# Patient Record
Sex: MALE | Race: OTHER | NOT HISPANIC OR LATINO | URBAN - METROPOLITAN AREA
[De-identification: names, ages, dates, MRNs, and addresses within clinical notes are randomized per-mention and may not be internally consistent; named-entity substitution may affect disease eponyms.]

---

## 2022-05-31 ENCOUNTER — INPATIENT (INPATIENT)
Facility: HOSPITAL | Age: 32
LOS: 1 days | Discharge: HOME | DRG: 917 | End: 2022-06-02
Attending: INTERNAL MEDICINE | Admitting: INTERNAL MEDICINE
Payer: MEDICAID

## 2022-05-31 VITALS
TEMPERATURE: 98 F | OXYGEN SATURATION: 98 % | HEIGHT: 68 IN | HEART RATE: 93 BPM | SYSTOLIC BLOOD PRESSURE: 171 MMHG | WEIGHT: 154.98 LBS | DIASTOLIC BLOOD PRESSURE: 78 MMHG | RESPIRATION RATE: 18 BRPM

## 2022-05-31 DIAGNOSIS — R09.02 HYPOXEMIA: ICD-10-CM

## 2022-05-31 LAB
ALBUMIN SERPL ELPH-MCNC: 3.3 G/DL — LOW (ref 3.5–5)
ALP SERPL-CCNC: 97 U/L — SIGNIFICANT CHANGE UP (ref 40–120)
ALT FLD-CCNC: 26 U/L DA — SIGNIFICANT CHANGE UP (ref 10–60)
AMPHET UR-MCNC: NEGATIVE — SIGNIFICANT CHANGE UP
ANION GAP SERPL CALC-SCNC: 6 MMOL/L — SIGNIFICANT CHANGE UP (ref 5–17)
ANION GAP SERPL CALC-SCNC: 9 MMOL/L — SIGNIFICANT CHANGE UP (ref 5–17)
APAP SERPL-MCNC: <2 UG/ML — LOW (ref 10–30)
APTT BLD: 28.1 SEC — SIGNIFICANT CHANGE UP (ref 27.5–35.5)
AST SERPL-CCNC: 23 U/L — SIGNIFICANT CHANGE UP (ref 10–40)
BARBITURATES UR SCN-MCNC: NEGATIVE — SIGNIFICANT CHANGE UP
BASE EXCESS BLDA CALC-SCNC: -1.1 MMOL/L — SIGNIFICANT CHANGE UP (ref -2–3)
BASOPHILS # BLD AUTO: 0 K/UL — SIGNIFICANT CHANGE UP (ref 0–0.2)
BASOPHILS NFR BLD AUTO: 0 % — SIGNIFICANT CHANGE UP (ref 0–2)
BENZODIAZ UR-MCNC: NEGATIVE — SIGNIFICANT CHANGE UP
BILIRUB SERPL-MCNC: 0.3 MG/DL — SIGNIFICANT CHANGE UP (ref 0.2–1.2)
BLD GP AB SCN SERPL QL: SIGNIFICANT CHANGE UP
BLOOD GAS COMMENTS ARTERIAL: SIGNIFICANT CHANGE UP
BUN SERPL-MCNC: 11 MG/DL — SIGNIFICANT CHANGE UP (ref 7–18)
BUN SERPL-MCNC: 11 MG/DL — SIGNIFICANT CHANGE UP (ref 7–18)
CALCIUM SERPL-MCNC: 8.1 MG/DL — LOW (ref 8.4–10.5)
CALCIUM SERPL-MCNC: 8.4 MG/DL — SIGNIFICANT CHANGE UP (ref 8.4–10.5)
CHLORIDE SERPL-SCNC: 100 MMOL/L — SIGNIFICANT CHANGE UP (ref 96–108)
CHLORIDE SERPL-SCNC: 103 MMOL/L — SIGNIFICANT CHANGE UP (ref 96–108)
CO2 SERPL-SCNC: 27 MMOL/L — SIGNIFICANT CHANGE UP (ref 22–31)
CO2 SERPL-SCNC: 30 MMOL/L — SIGNIFICANT CHANGE UP (ref 22–31)
COCAINE METAB.OTHER UR-MCNC: POSITIVE
CREAT SERPL-MCNC: 1.19 MG/DL — SIGNIFICANT CHANGE UP (ref 0.5–1.3)
CREAT SERPL-MCNC: 1.48 MG/DL — HIGH (ref 0.5–1.3)
D DIMER BLD IA.RAPID-MCNC: 7486 NG/ML DDU — HIGH
EGFR: 65 ML/MIN/1.73M2 — SIGNIFICANT CHANGE UP
EGFR: 84 ML/MIN/1.73M2 — SIGNIFICANT CHANGE UP
EOSINOPHIL # BLD AUTO: 0 K/UL — SIGNIFICANT CHANGE UP (ref 0–0.5)
EOSINOPHIL NFR BLD AUTO: 0 % — SIGNIFICANT CHANGE UP (ref 0–6)
ETHANOL SERPL-MCNC: <3 MG/DL — SIGNIFICANT CHANGE UP (ref 0–10)
GLUCOSE SERPL-MCNC: 185 MG/DL — HIGH (ref 70–99)
GLUCOSE SERPL-MCNC: 252 MG/DL — HIGH (ref 70–99)
HCO3 BLDA-SCNC: 24 MMOL/L — SIGNIFICANT CHANGE UP (ref 21–28)
HCT VFR BLD CALC: 41.7 % — SIGNIFICANT CHANGE UP (ref 39–50)
HCT VFR BLD CALC: 48 % — SIGNIFICANT CHANGE UP (ref 39–50)
HGB BLD-MCNC: 13.8 G/DL — SIGNIFICANT CHANGE UP (ref 13–17)
HGB BLD-MCNC: 15.6 G/DL — SIGNIFICANT CHANGE UP (ref 13–17)
HIV 1 & 2 AB SERPL IA.RAPID: SIGNIFICANT CHANGE UP
HOROWITZ INDEX BLDA+IHG-RTO: 100 — SIGNIFICANT CHANGE UP
INR BLD: 0.94 RATIO — SIGNIFICANT CHANGE UP (ref 0.88–1.16)
LACTATE SERPL-SCNC: 1.1 MMOL/L — SIGNIFICANT CHANGE UP (ref 0.7–2)
LACTATE SERPL-SCNC: 5.8 MMOL/L — CRITICAL HIGH (ref 0.7–2)
LIDOCAIN IGE QN: 172 U/L — SIGNIFICANT CHANGE UP (ref 73–393)
LYMPHOCYTES # BLD AUTO: 2.35 K/UL — SIGNIFICANT CHANGE UP (ref 1–3.3)
LYMPHOCYTES # BLD AUTO: 8 % — LOW (ref 13–44)
MAGNESIUM SERPL-MCNC: 2.1 MG/DL — SIGNIFICANT CHANGE UP (ref 1.6–2.6)
MAGNESIUM SERPL-MCNC: 2.4 MG/DL — SIGNIFICANT CHANGE UP (ref 1.6–2.6)
MANUAL SMEAR VERIFICATION: SIGNIFICANT CHANGE UP
MCHC RBC-ENTMCNC: 29.4 PG — SIGNIFICANT CHANGE UP (ref 27–34)
MCHC RBC-ENTMCNC: 29.5 PG — SIGNIFICANT CHANGE UP (ref 27–34)
MCHC RBC-ENTMCNC: 32.5 GM/DL — SIGNIFICANT CHANGE UP (ref 32–36)
MCHC RBC-ENTMCNC: 33.1 GM/DL — SIGNIFICANT CHANGE UP (ref 32–36)
MCV RBC AUTO: 89.1 FL — SIGNIFICANT CHANGE UP (ref 80–100)
MCV RBC AUTO: 90.6 FL — SIGNIFICANT CHANGE UP (ref 80–100)
METHADONE UR-MCNC: NEGATIVE — SIGNIFICANT CHANGE UP
MONOCYTES # BLD AUTO: 1.77 K/UL — HIGH (ref 0–0.9)
MONOCYTES NFR BLD AUTO: 6 % — SIGNIFICANT CHANGE UP (ref 2–14)
NEUTROPHILS # BLD AUTO: 25.31 K/UL — HIGH (ref 1.8–7.4)
NEUTROPHILS NFR BLD AUTO: 86 % — HIGH (ref 43–77)
NRBC # BLD: 0 /100 WBCS — SIGNIFICANT CHANGE UP (ref 0–0)
NRBC # BLD: 0 /100 — SIGNIFICANT CHANGE UP (ref 0–0)
NT-PROBNP SERPL-SCNC: 14 PG/ML — SIGNIFICANT CHANGE UP (ref 0–125)
OPIATES UR-MCNC: NEGATIVE — SIGNIFICANT CHANGE UP
PCO2 BLDA: 38 MMHG — SIGNIFICANT CHANGE UP (ref 35–48)
PCP SPEC-MCNC: SIGNIFICANT CHANGE UP
PCP UR-MCNC: NEGATIVE — SIGNIFICANT CHANGE UP
PH BLDA: 7.4 — SIGNIFICANT CHANGE UP (ref 7.35–7.45)
PHOSPHATE SERPL-MCNC: 4.2 MG/DL — SIGNIFICANT CHANGE UP (ref 2.5–4.5)
PLAT MORPH BLD: NORMAL — SIGNIFICANT CHANGE UP
PLATELET # BLD AUTO: 186 K/UL — SIGNIFICANT CHANGE UP (ref 150–400)
PLATELET # BLD AUTO: 256 K/UL — SIGNIFICANT CHANGE UP (ref 150–400)
PO2 BLDA: 189 MMHG — HIGH (ref 83–108)
POTASSIUM SERPL-MCNC: 3 MMOL/L — LOW (ref 3.5–5.3)
POTASSIUM SERPL-MCNC: 4.3 MMOL/L — SIGNIFICANT CHANGE UP (ref 3.5–5.3)
POTASSIUM SERPL-SCNC: 3 MMOL/L — LOW (ref 3.5–5.3)
POTASSIUM SERPL-SCNC: 4.3 MMOL/L — SIGNIFICANT CHANGE UP (ref 3.5–5.3)
PROCALCITONIN SERPL-MCNC: 1.34 NG/ML — HIGH (ref 0.02–0.1)
PROT SERPL-MCNC: 6.8 G/DL — SIGNIFICANT CHANGE UP (ref 6–8.3)
PROTHROM AB SERPL-ACNC: 11.2 SEC — SIGNIFICANT CHANGE UP (ref 10.5–13.4)
RAPID RVP RESULT: DETECTED
RBC # BLD: 4.68 M/UL — SIGNIFICANT CHANGE UP (ref 4.2–5.8)
RBC # BLD: 5.3 M/UL — SIGNIFICANT CHANGE UP (ref 4.2–5.8)
RBC # FLD: 12.8 % — SIGNIFICANT CHANGE UP (ref 10.3–14.5)
RBC # FLD: 12.9 % — SIGNIFICANT CHANGE UP (ref 10.3–14.5)
RBC BLD AUTO: NORMAL — SIGNIFICANT CHANGE UP
RV+EV RNA SPEC QL NAA+PROBE: DETECTED
SALICYLATES SERPL-MCNC: <1.7 MG/DL — LOW (ref 2.8–20)
SAO2 % BLDA: 99 % — SIGNIFICANT CHANGE UP
SARS-COV-2 RNA SPEC QL NAA+PROBE: SIGNIFICANT CHANGE UP
SODIUM SERPL-SCNC: 136 MMOL/L — SIGNIFICANT CHANGE UP (ref 135–145)
SODIUM SERPL-SCNC: 139 MMOL/L — SIGNIFICANT CHANGE UP (ref 135–145)
THC UR QL: POSITIVE
TROPONIN I, HIGH SENSITIVITY RESULT: 17.5 NG/L — SIGNIFICANT CHANGE UP
WBC # BLD: 23.22 K/UL — HIGH (ref 3.8–10.5)
WBC # BLD: 29.43 K/UL — HIGH (ref 3.8–10.5)
WBC # FLD AUTO: 23.22 K/UL — HIGH (ref 3.8–10.5)
WBC # FLD AUTO: 29.43 K/UL — HIGH (ref 3.8–10.5)

## 2022-05-31 PROCEDURE — 99291 CRITICAL CARE FIRST HOUR: CPT

## 2022-05-31 PROCEDURE — 71045 X-RAY EXAM CHEST 1 VIEW: CPT | Mod: 26

## 2022-05-31 PROCEDURE — 99291 CRITICAL CARE FIRST HOUR: CPT | Mod: GC

## 2022-05-31 PROCEDURE — 99292 CRITICAL CARE ADDL 30 MIN: CPT

## 2022-05-31 PROCEDURE — 71275 CT ANGIOGRAPHY CHEST: CPT | Mod: 26

## 2022-05-31 RX ORDER — CEFTRIAXONE 500 MG/1
1000 INJECTION, POWDER, FOR SOLUTION INTRAMUSCULAR; INTRAVENOUS EVERY 24 HOURS
Refills: 0 | Status: DISCONTINUED | OUTPATIENT
Start: 2022-06-01 | End: 2022-06-02

## 2022-05-31 RX ORDER — CEFTRIAXONE 500 MG/1
1000 INJECTION, POWDER, FOR SOLUTION INTRAMUSCULAR; INTRAVENOUS ONCE
Refills: 0 | Status: COMPLETED | OUTPATIENT
Start: 2022-05-31 | End: 2022-05-31

## 2022-05-31 RX ORDER — INFLUENZA VIRUS VACCINE 15; 15; 15; 15 UG/.5ML; UG/.5ML; UG/.5ML; UG/.5ML
0.5 SUSPENSION INTRAMUSCULAR ONCE
Refills: 0 | Status: DISCONTINUED | OUTPATIENT
Start: 2022-05-31 | End: 2022-06-02

## 2022-05-31 RX ORDER — FUROSEMIDE 40 MG
40 TABLET ORAL ONCE
Refills: 0 | Status: COMPLETED | OUTPATIENT
Start: 2022-05-31 | End: 2022-05-31

## 2022-05-31 RX ORDER — POTASSIUM CHLORIDE 20 MEQ
10 PACKET (EA) ORAL
Refills: 0 | Status: DISCONTINUED | OUTPATIENT
Start: 2022-05-31 | End: 2022-05-31

## 2022-05-31 RX ORDER — CHLORHEXIDINE GLUCONATE 213 G/1000ML
1 SOLUTION TOPICAL
Refills: 0 | Status: DISCONTINUED | OUTPATIENT
Start: 2022-05-31 | End: 2022-06-02

## 2022-05-31 RX ORDER — AZITHROMYCIN 500 MG/1
500 TABLET, FILM COATED ORAL EVERY 24 HOURS
Refills: 0 | Status: COMPLETED | OUTPATIENT
Start: 2022-05-31 | End: 2022-06-02

## 2022-05-31 RX ORDER — POTASSIUM CHLORIDE 20 MEQ
40 PACKET (EA) ORAL ONCE
Refills: 0 | Status: COMPLETED | OUTPATIENT
Start: 2022-05-31 | End: 2022-05-31

## 2022-05-31 RX ADMIN — Medication 40 MILLIEQUIVALENT(S): at 18:48

## 2022-05-31 RX ADMIN — CEFTRIAXONE 100 MILLIGRAM(S): 500 INJECTION, POWDER, FOR SOLUTION INTRAMUSCULAR; INTRAVENOUS at 14:16

## 2022-05-31 RX ADMIN — AZITHROMYCIN 255 MILLIGRAM(S): 500 TABLET, FILM COATED ORAL at 18:49

## 2022-05-31 RX ADMIN — Medication 40 MILLIGRAM(S): at 16:34

## 2022-05-31 NOTE — ED PROVIDER NOTE - CLINICAL SUMMARY MEDICAL DECISION MAKING FREE TEXT BOX
32M presenting after being given narcan for overdose. unknown amount of narcan given by friend. patient hypoxic to 67% on room air with good wave form, improved to 70's with NRB. placed on bipap for concern for pulmonary edema 2/2 narcan. patient easily arousable throughout event. elevated wbc count likely reactive, however, with give antibiotics as patient reports flu like symptoms prior to today, and cxr reads possible multifocal pneumonia. will not give fluids at this time for elevated lactate as lungs likely fluid overloaded and patient unlikely sepsis. will consult ICU.

## 2022-05-31 NOTE — ED PROVIDER NOTE - OBJECTIVE STATEMENT
32M presenting after an overdose. patient reports he used heroine, fentanyl and crack today. apparently stopped breathing because his friend gave him narcan. patient believes it was two intranasal doses of narcan. afterwards the patient began coughing blood. reports he was feeling sick over the past few days, like he had a virus. currently has trouble breathing.

## 2022-05-31 NOTE — H&P ADULT - ATTENDING COMMENTS
Patient seen and examined in the ED with ICU resident. Data reviewed. Case and plan of care discussed with resident and agree with note except as otherwise stated in my note below.    This is 32year old male with history of substance abuse (opiates) presented to the ED with s/p overdose of heroin. As per patient he overdosed on fentanyl and has associated cough with some hemoptysis. In the ED he was found to be hypoxemic with bilateral infiltrates on CXR and placed on bipap for suspected acute pulmonary edema (APE). On evaluation, he was comfortable on bi-level support, and FIO2 quickly titrated down to 40%.     VS reviewed and stable. Young male on bipap, not in distress and able to speak in full sentences, neuro- awake, alert, oriented x3, no focal deficits, heart- normal heart sounds, Lungs- bilateral air entry with rales, Abdomen- Full, soft, no tenderness, Ext: no pedal edema.    Labs/Imaging reviewed and remarkable for leukocytosis WBC 29K, K 3.0, Cr 1.48, Lactate 5.8, ProBNP D-dimer 14.  CXR remarkable for bilateral interstitial infiltrates worse on right, no pleff.  Bedside POCUS- lung remarkable for bilateral B lines worse on right, bilateral lung sliding, POCUS Cardiac showed good LV contractility, no RV strain, no pericardial effusion, IVC small and collapsible.     Assessment  Acute hypoxemic respiratory failure from acute noncardiogenic pulmonary edema- tolerating noninvasive ventilation (NIV).  Acute noncardiogenic pulmonary edema likely from illicit drug overdose (opiates/cocaine).  Polysubstance abuse- urine toxicology positive for cocaine, THC  Hypokalemia  Lactic acidosis.    Plan  Accepted to ICU for further management.  Noninvasive ventilation with BIPAP for now. May be able to discontinue bi-level support and transition to NC oxygen. NPO for now.  Empiric Ceftriaxone and azithromycin for now to cover possible underlying bacterial pneumonia. Send procalcitonin to help discontinue antibiotics if negative. Meanwhile obtain blood cultures. Repeat lactate and target <2.  Replete potassium  DVT prophylaxis with Lovenox.    Condition: Critical, Prognosis: Good.

## 2022-05-31 NOTE — ED PROVIDER NOTE - PHYSICAL EXAMINATION
General: lethargic appearing male, moderate distress   HEENT: normocephalic, atraumatic, PERRL, blood around nares   Respiratory: normal work of breathing, lungs clear to auscultation bilaterally   Cardiac: regular rate and rhythm   Abdomen: soft, non-tender, no guarding or rebound   MSK: no swelling or tenderness of lower extremities, moving all extremities spontaneously   Skin: ashen   Neuro: A&Ox3, arousable to voice and touch

## 2022-05-31 NOTE — PATIENT PROFILE ADULT - FALL HARM RISK - HARM RISK INTERVENTIONS

## 2022-05-31 NOTE — ED ADULT NURSE NOTE - OBJECTIVE STATEMENT
Received patient as a walk in notification with overdose on fentanyl unknown amount patient states " he was doing it hard" , bleeding from nose and spitting up blood, As per patient he was given narcan 2 by friend

## 2022-05-31 NOTE — H&P ADULT - HISTORY OF PRESENT ILLNESS
Patient is a 32y old  Male who presents with a chief complaint of opioid overdose    HPI: Patient is a 32 year old with no reported PMHx who presented with chief complaint of overdose. Patient smoked a tiny amount of fentanyl through a crack pipe in which he lost consciousness and then he was given 2 doses of intranasal narcan and presented to hospital. Patient also reported cough, sputum production, and shortness of breath for the past couple of days. Started having hemoptysis today. Otherwise no chest pain, abdominal pain, fever, chills, diarrhea.    In the ED, patient was noted hypoxic, started on BiPAP. Xray revealed multifocal pneumonia, possible fluid overload. D-Dimer markedly elevated and CTAngio chest pending. Patient admitted to ICU for acute hypoxic respiratory failure requirinig BIPAP        ICU Vital Signs Last 24 Hrs  T(C): 36.6 (31 May 2022 15:48), Max: 36.6 (31 May 2022 12:29)  T(F): 97.8 (31 May 2022 15:48), Max: 97.8 (31 May 2022 12:29)  HR: 60 (31 May 2022 16:34) (60 - 93)  BP: 108/59 (31 May 2022 16:34) (100/55 - 171/78)  BP(mean): --  ABP: --  ABP(mean): --  RR: 17 (31 May 2022 16:34) (14 - 18)  SpO2: 100% (31 May 2022 16:34) (98% - 100%)    I&O's Summary        LABS:                        15.6   29.43 )-----------( 256      ( 31 May 2022 13:36 )             48.0     05-31    139  |  103  |  11  ----------------------------<  252<H>  3.0<L>   |  27  |  1.48<H>    Ca    8.4      31 May 2022 13:36  Mg     2.4     05-31    TPro  6.8  /  Alb  3.3<L>  /  TBili  0.3  /  DBili  x   /  AST  23  /  ALT  26  /  AlkPhos  97  05-31    PT/INR - ( 31 May 2022 13:36 )   PT: 11.2 sec;   INR: 0.94 ratio         PTT - ( 31 May 2022 13:36 )  PTT:28.1 sec    CAPILLARY BLOOD GLUCOSE      POCT Blood Glucose.: 235 mg/dL (31 May 2022 12:38)    ABG - ( 31 May 2022 13:16 )  pH, Arterial: 7.40  pH, Blood: x     /  pCO2: 38    /  pO2: 189   / HCO3: 24    / Base Excess: -1.1  /  SaO2: 99                  RADIOLOGY & ADDITIONAL TESTS:    Consultant(s) Notes Reviewed:  [x ] YES  [ ] NO    MEDICATIONS  (STANDING):  chlorhexidine 2% Cloths 1 Application(s) Topical <User Schedule>  potassium chloride  10 mEq/100 mL IVPB 10 milliEquivalent(s) IV Intermittent every 1 hour    MEDICATIONS  (PRN):      PHYSICAL EXAM:  GENERAL: well built, well nourished, no acute distress, in yellow gown, young, appears pale  HEAD:  Atraumatic, Normocephalic  EYES: EOMI, PERRLA, conjunctiva and sclera clear  ENT: No tonsillar erythema, exudates, or enlargement; Dry mucous membranes, Good dentition, No lesions  NECK: Supple, No JVD, Normal thyroid, no enlarged nodes  NERVOUS SYSTEM:  Alert & Oriented X3, no focal deficits  CHEST/LUNG: Diffuse rhonchi, right lung decreased breath sounds  HEART: S1S2 normal, no S3, Regular rate and rhythm; No murmurs, rubs, or gallops  ABDOMEN: Soft, Nontender, Nondistended; Bowel sounds present  EXTREMITIES:  2+ Peripheral Pulses, No clubbing, cyanosis, or edema  LYMPH: No lymphadenopathy noted  SKIN: No rashes or lesions    Care Discussed with Consultants/Other Providers [ x] YES  [ ] NO
clear

## 2022-05-31 NOTE — H&P ADULT - ASSESSMENT
Patient is a 32 year old male with PMHx of polysubstance abuse who presented s/p overdose, now found to be hypoxic requiring BiPAP    Assessment  - acute hypoxic respiratory failure  - Opioid overdose  - pulmonary edema  - pneumonia  - Poly substance abuse  - YAMINI    Plan:  Neuro:  # Polysubstance abuse  - Follow urine tox, avoid BB  - SW conult  - s/p narcan for opioid overdose    CV:  # No acute issues  - noncardiogenic pulmonary edema  - bedside echo with adequate ventricular function    Pulm:  # Acute hypoxic respiratory failure 2/2 Noncardiogenic pulmonary edema  - Bedside echo with confluent B-lines  - Suspect noncardiogenic secondary to opioid use vs Narcan  - Continue BiPAP  - Given 40mg Lasix in ED  - hold further diuresis, gentle hydration  - echocardiogram   - markedly elevated d-dimers, will obtain CT angio chest    ID:  # Community Acquired Pneumonia  - HPI with reports of sputum production, cough, SOB  - will cover empirically with ceftriaxone and azithromycin  - obtain procal in AM and if negative then interstitial infiltrates likely due to pulm edema  - CT angio chest is pending     Nephro:  # YAMINI  - Likely pre-renal  - s/p lasix in ED, hold further diuresis and proceed with gentle IVF hydration  - Repeat BMP    GI:  - npo until off bipap      Endo:  # Elevted blood glucose  - obtain a1c    FEN:  - Judicious use of IVf    Prophy:  no chemoprophylaxis indicated    Dispo:  - monitor in the ICU until off bipap  Patient is a 32 year old male with PMHx of polysubstance abuse who presented s/p overdose, now found to be hypoxic requiring BiPAP    Assessment  - acute hypoxic respiratory failure  - Opioid overdose  - pulmonary edema  - pneumonia  - Poly substance abuse  - YAMINI    Plan:  Neuro:  # Polysubstance abuse  - Follow urine tox, avoid BB  - SW conult  - s/p narcan for opioid overdose    CV:  # No acute issues  - noncardiogenic pulmonary edema  - bedside echo with adequate ventricular function    Pulm:  # Acute hypoxic respiratory failure 2/2 Noncardiogenic pulmonary edema  - Bedside echo with confluent B-lines  - Suspect noncardiogenic secondary to opioid use vs Narcan  - Continue BiPAP  - Given 40mg Lasix in ED  - hold further diuresis, gentle hydration  - echocardiogram   - markedly elevated d-dimers, will obtain CT angio chest  - hemoptysis likely in the setting of acute pulmonary edema    ID:  # Community Acquired Pneumonia  - HPI with reports of sputum production, cough, SOB  - will cover empirically with ceftriaxone and azithromycin  - obtain procal in AM and if negative then interstitial infiltrates likely due to pulm edema  - CT angio chest is pending     Nephro:  # YAMINI  - Likely pre-renal  - s/p lasix in ED, hold further diuresis and proceed with gentle IVF hydration  - Repeat BMP    GI:  - npo until off bipap      Endo:  # Elevted blood glucose  - obtain a1c    FEN:  - Judicious use of IVf    Prophy:  no chemoprophylaxis indicated    Dispo:  - monitor in the ICU until off bipap

## 2022-05-31 NOTE — ED ADULT NURSE NOTE - NSIMPLEMENTINTERV_GEN_ALL_ED
Implemented All Fall with Harm Risk Interventions:  [place] to call system. Call bell, personal items and telephone within reach. Instruct patient to call for assistance. Room bathroom lighting operational. Non-slip footwear when patient is off stretcher. Physically safe environment: no spills, clutter or unnecessary equipment. Stretcher in lowest position, wheels locked, appropriate side rails in place. Provide visual cue, wrist band, yellow gown, etc. Monitor gait and stability. Monitor for mental status changes and reorient to person, place, and time. Review medications for side effects contributing to fall risk. Reinforce activity limits and safety measures with patient and family. Provide visual clues: red socks.

## 2022-05-31 NOTE — ED ADULT NURSE NOTE - ED STAT RN HANDOFF DETAILS
Patient admitted to ICU report given to Mary. Patient being transported via stretcher with cardiac monitor , oxygen by MD, RN,and transporter first ton CT scan to to unit safety maintained.

## 2022-06-01 LAB
A1C WITH ESTIMATED AVERAGE GLUCOSE RESULT: 5.4 % — SIGNIFICANT CHANGE UP (ref 4–5.6)
ANION GAP SERPL CALC-SCNC: 2 MMOL/L — LOW (ref 5–17)
BUN SERPL-MCNC: 10 MG/DL — SIGNIFICANT CHANGE UP (ref 7–18)
CALCIUM SERPL-MCNC: 8.9 MG/DL — SIGNIFICANT CHANGE UP (ref 8.4–10.5)
CHLORIDE SERPL-SCNC: 104 MMOL/L — SIGNIFICANT CHANGE UP (ref 96–108)
CO2 SERPL-SCNC: 33 MMOL/L — HIGH (ref 22–31)
CREAT SERPL-MCNC: 1.1 MG/DL — SIGNIFICANT CHANGE UP (ref 0.5–1.3)
EGFR: 93 ML/MIN/1.73M2 — SIGNIFICANT CHANGE UP
ESTIMATED AVERAGE GLUCOSE: 108 MG/DL — SIGNIFICANT CHANGE UP (ref 68–114)
GLUCOSE SERPL-MCNC: 104 MG/DL — HIGH (ref 70–99)
HCT VFR BLD CALC: 41.4 % — SIGNIFICANT CHANGE UP (ref 39–50)
HGB BLD-MCNC: 13.4 G/DL — SIGNIFICANT CHANGE UP (ref 13–17)
MAGNESIUM SERPL-MCNC: 2.5 MG/DL — SIGNIFICANT CHANGE UP (ref 1.6–2.6)
MCHC RBC-ENTMCNC: 28.6 PG — SIGNIFICANT CHANGE UP (ref 27–34)
MCHC RBC-ENTMCNC: 32.4 GM/DL — SIGNIFICANT CHANGE UP (ref 32–36)
MCV RBC AUTO: 88.5 FL — SIGNIFICANT CHANGE UP (ref 80–100)
NRBC # BLD: 0 /100 WBCS — SIGNIFICANT CHANGE UP (ref 0–0)
PHOSPHATE SERPL-MCNC: 3.5 MG/DL — SIGNIFICANT CHANGE UP (ref 2.5–4.5)
PLATELET # BLD AUTO: 195 K/UL — SIGNIFICANT CHANGE UP (ref 150–400)
POTASSIUM SERPL-MCNC: 4.2 MMOL/L — SIGNIFICANT CHANGE UP (ref 3.5–5.3)
POTASSIUM SERPL-SCNC: 4.2 MMOL/L — SIGNIFICANT CHANGE UP (ref 3.5–5.3)
RBC # BLD: 4.68 M/UL — SIGNIFICANT CHANGE UP (ref 4.2–5.8)
RBC # FLD: 12.7 % — SIGNIFICANT CHANGE UP (ref 10.3–14.5)
SODIUM SERPL-SCNC: 139 MMOL/L — SIGNIFICANT CHANGE UP (ref 135–145)
WBC # BLD: 17.99 K/UL — HIGH (ref 3.8–10.5)
WBC # FLD AUTO: 17.99 K/UL — HIGH (ref 3.8–10.5)

## 2022-06-01 PROCEDURE — 71045 X-RAY EXAM CHEST 1 VIEW: CPT | Mod: 26

## 2022-06-01 RX ADMIN — CHLORHEXIDINE GLUCONATE 1 APPLICATION(S): 213 SOLUTION TOPICAL at 05:50

## 2022-06-01 RX ADMIN — AZITHROMYCIN 255 MILLIGRAM(S): 500 TABLET, FILM COATED ORAL at 18:17

## 2022-06-01 RX ADMIN — CEFTRIAXONE 100 MILLIGRAM(S): 500 INJECTION, POWDER, FOR SOLUTION INTRAMUSCULAR; INTRAVENOUS at 05:50

## 2022-06-01 NOTE — PROGRESS NOTE ADULT - ASSESSMENT
Patient is a 32 year old male with PMHx of polysubstance abuse who presented s/p overdose, now found to be hypoxic requiring BiPAP    Assessment  - acute hypoxic respiratory failure  - Opioid overdose  - pulmonary edema  - pneumonia  - Poly substance abuse  - YAMINI    Plan:  Neuro:  # Polysubstance abuse  - Utox positive for THC, Cocaine  - SW consult  - s/p narcan for opioid overdose    CV:  # No acute issues  - noncardiogenic pulmonary edema  - bedside echo with adequate ventricular function    Pulm:  # Acute hypoxic respiratory failure 2/2 Noncardiogenic pulmonary edema  - Bedside echo with confluent B-lines  - Suspect noncardiogenic secondary to opioid use vs Narcan  - Continue BiPAP  - Given 40mg Lasix in ED  - hold further diuresis, gentle hydration  - f/u echocardiogram   - CTA negative for PE  - hemoptysis likely in the setting of acute pulmonary edema  - Off BIPAP    ID:  # Community Acquired Pneumonia  - HPI with reports of sputum production, cough, SOB  - CTA negative for PE, shows b/l infiltrates  - Procal elevated  - Will continue to cover for PNA with rocephin and azithromycin    Nephro:  # YAMINI  - Likely pre-renal  - s/p lasix in ED, hold further diuresis and proceed with gentle IVF hydration  - Resolved    GI:  - npo until off bipap    Endo:  # Elevated blood glucose  - A1c 5.4    FEN:  - Judicious use of IVf    Prophy:  no chemoprophylaxis indicated    Dispo:  - [place] to medicine Patient is a 32 year old male with PMHx of polysubstance abuse who presented s/p overdose, now found to be hypoxic requiring BiPAP    Assessment  - acute hypoxic respiratory failure  - Opioid overdose  - pulmonary edema  - pneumonia  - Poly substance abuse  - YAMINI    Plan:  Neuro:  # Polysubstance abuse  - Utox positive for THC, Cocaine  - SW consult  - s/p narcan for opioid overdose    CV:  # No acute issues  - noncardiogenic pulmonary edema  - bedside echo with adequate ventricular function    Pulm:  # Acute hypoxic respiratory failure 2/2 Noncardiogenic pulmonary edema  - Bedside echo with confluent B-lines  - Suspect noncardiogenic secondary to opioid use vs Narcan  - Continue BiPAP  - Given 40mg Lasix in ED  - hold further diuresis, gentle hydration  - f/u echocardiogram   - CTA negative for PE  - hemoptysis likely in the setting of acute pulmonary edema  - Off BIPAP    ID:  # Community Acquired vs aspiration PNA  - HPI with reports of sputum production, cough, SOB  - CTA negative for PE, shows b/l infiltrates  - Procal elevated  - Will continue to cover for PNA with rocephin and azithromycin    Nephro:  # YAMINI  - Likely pre-renal  - s/p lasix in ED, hold further diuresis and proceed with gentle IVF hydration  - Resolved    GI:  - npo until off bipap    Endo:  # Elevated blood glucose  - A1c 5.4    FEN:  - Judicious use of IVf    Prophy:  no chemoprophylaxis indicated    Dispo:  - [place] to medicine Patient is a 32 year old male with PMHx of polysubstance abuse who presented s/p overdose, now found to be hypoxic requiring BiPAP    Assessment  - acute hypoxic respiratory failure  - Opioid overdose  - pulmonary edema  - pneumonia  - Poly substance abuse  - YAMINI    Plan:  Neuro:  # Polysubstance abuse  - Utox positive for THC, Cocaine  - SW consult  - s/p narcan for opioid overdose    CV:  # No acute issues  - noncardiogenic pulmonary edema  - Echo wnl    Pulm:  # Acute hypoxic respiratory failure 2/2 Noncardiogenic pulmonary edema  - Bedside echo with confluent B-lines  - Suspect noncardiogenic secondary to opioid use vs Narcan  - Continue BiPAP  - Given 40mg Lasix in ED  - hold further diuresis, gentle hydration  - CTA negative for PE  - hemoptysis likely in the setting of acute pulmonary edema  - Echo wnl  - Off BIPAP    ID:  # Community Acquired vs aspiration PNA  - HPI with reports of sputum production, cough, SOB  - CTA negative for PE, shows b/l infiltrates  - Procal elevated  - Will continue to cover for PNA with rocephin and azithromycin    Nephro:  # YAMINI  - Likely pre-renal  - s/p lasix in ED, hold further diuresis and proceed with gentle IVF hydration  - Resolved    GI:  - npo until off bipap    Endo:  # Elevated blood glucose  - A1c 5.4    FEN:  - Judicious use of IVf    Prophy:  no chemoprophylaxis indicated    Dispo:  - [place] to medicine

## 2022-06-01 NOTE — CHART NOTE - NSCHARTNOTEFT_GEN_A_CORE
Patient is a 32 year old with no reported PMHx who presented with chief complaint of overdose. Patient smoked a tiny amount of fentanyl through a crack pipe in which he lost consciousness and then he was given 2 doses of intranasal narcan and presented to hospital. Patient also reported cough, sputum production, and shortness of breath for the past couple of days. Started having hemoptysis today. Otherwise no chest pain, abdominal pain, fever, chills, diarrhea.    Patient hypoxic started on BIPAP in ED.  Xray revealed multifocal pneumonia vs possible fluid overload. Admitted to ICU for noncardiogenic pulmonary edema requiring BIPAP. Edema likely in setting of fentanyl vs narcan use. Was given lasix 40mg but further diuresis was held. Was started on  rocephin and azithro for possible multifocal pneumonia. D-dimer was elevated, CTA showed b/l pulmonary infiltrates and emphysema, NO PE. Echo showed normal function. Patient was weened off BIPAP onto room-air. Patient also had YAMINI which resolved. Patient was signed out to resident/NP/PA XXXXX and attending XXXX.    Things to follow:  AUSTIN garretts

## 2022-06-02 VITALS
OXYGEN SATURATION: 98 % | HEART RATE: 80 BPM | RESPIRATION RATE: 18 BRPM | DIASTOLIC BLOOD PRESSURE: 58 MMHG | SYSTOLIC BLOOD PRESSURE: 120 MMHG

## 2022-06-02 LAB
ALBUMIN SERPL ELPH-MCNC: 2.9 G/DL — LOW (ref 3.5–5)
ALP SERPL-CCNC: 65 U/L — SIGNIFICANT CHANGE UP (ref 40–120)
ALT FLD-CCNC: 20 U/L DA — SIGNIFICANT CHANGE UP (ref 10–60)
ANION GAP SERPL CALC-SCNC: 1 MMOL/L — LOW (ref 5–17)
APPEARANCE UR: CLEAR — SIGNIFICANT CHANGE UP
AST SERPL-CCNC: 19 U/L — SIGNIFICANT CHANGE UP (ref 10–40)
BILIRUB SERPL-MCNC: 0.6 MG/DL — SIGNIFICANT CHANGE UP (ref 0.2–1.2)
BILIRUB UR-MCNC: NEGATIVE — SIGNIFICANT CHANGE UP
BUN SERPL-MCNC: 13 MG/DL — SIGNIFICANT CHANGE UP (ref 7–18)
CALCIUM SERPL-MCNC: 9.1 MG/DL — SIGNIFICANT CHANGE UP (ref 8.4–10.5)
CHLORIDE SERPL-SCNC: 108 MMOL/L — SIGNIFICANT CHANGE UP (ref 96–108)
CO2 SERPL-SCNC: 30 MMOL/L — SIGNIFICANT CHANGE UP (ref 22–31)
COLOR SPEC: YELLOW — SIGNIFICANT CHANGE UP
CREAT SERPL-MCNC: 1.07 MG/DL — SIGNIFICANT CHANGE UP (ref 0.5–1.3)
DIFF PNL FLD: NEGATIVE — SIGNIFICANT CHANGE UP
EGFR: 96 ML/MIN/1.73M2 — SIGNIFICANT CHANGE UP
GLUCOSE SERPL-MCNC: 97 MG/DL — SIGNIFICANT CHANGE UP (ref 70–99)
GLUCOSE UR QL: NEGATIVE — SIGNIFICANT CHANGE UP
GRAM STN FLD: SIGNIFICANT CHANGE UP
HCT VFR BLD CALC: 38 % — LOW (ref 39–50)
HGB BLD-MCNC: 12.4 G/DL — LOW (ref 13–17)
KETONES UR-MCNC: NEGATIVE — SIGNIFICANT CHANGE UP
LEUKOCYTE ESTERASE UR-ACNC: NEGATIVE — SIGNIFICANT CHANGE UP
MAGNESIUM SERPL-MCNC: 2.3 MG/DL — SIGNIFICANT CHANGE UP (ref 1.6–2.6)
MCHC RBC-ENTMCNC: 29.1 PG — SIGNIFICANT CHANGE UP (ref 27–34)
MCHC RBC-ENTMCNC: 32.6 GM/DL — SIGNIFICANT CHANGE UP (ref 32–36)
MCV RBC AUTO: 89.2 FL — SIGNIFICANT CHANGE UP (ref 80–100)
NITRITE UR-MCNC: NEGATIVE — SIGNIFICANT CHANGE UP
NRBC # BLD: 0 /100 WBCS — SIGNIFICANT CHANGE UP (ref 0–0)
PH UR: 8 — SIGNIFICANT CHANGE UP (ref 5–8)
PHOSPHATE SERPL-MCNC: 2.8 MG/DL — SIGNIFICANT CHANGE UP (ref 2.5–4.5)
PLATELET # BLD AUTO: 168 K/UL — SIGNIFICANT CHANGE UP (ref 150–400)
POTASSIUM SERPL-MCNC: 4.8 MMOL/L — SIGNIFICANT CHANGE UP (ref 3.5–5.3)
POTASSIUM SERPL-SCNC: 4.8 MMOL/L — SIGNIFICANT CHANGE UP (ref 3.5–5.3)
PROT SERPL-MCNC: 6.1 G/DL — SIGNIFICANT CHANGE UP (ref 6–8.3)
PROT UR-MCNC: NEGATIVE — SIGNIFICANT CHANGE UP
RBC # BLD: 4.26 M/UL — SIGNIFICANT CHANGE UP (ref 4.2–5.8)
RBC # FLD: 12.9 % — SIGNIFICANT CHANGE UP (ref 10.3–14.5)
SODIUM SERPL-SCNC: 139 MMOL/L — SIGNIFICANT CHANGE UP (ref 135–145)
SP GR SPEC: 1.01 — SIGNIFICANT CHANGE UP (ref 1.01–1.02)
SPECIMEN SOURCE: SIGNIFICANT CHANGE UP
UROBILINOGEN FLD QL: NEGATIVE — SIGNIFICANT CHANGE UP
WBC # BLD: 10.82 K/UL — HIGH (ref 3.8–10.5)
WBC # FLD AUTO: 10.82 K/UL — HIGH (ref 3.8–10.5)

## 2022-06-02 PROCEDURE — 84145 PROCALCITONIN (PCT): CPT

## 2022-06-02 PROCEDURE — 86901 BLOOD TYPING SEROLOGIC RH(D): CPT

## 2022-06-02 PROCEDURE — 99291 CRITICAL CARE FIRST HOUR: CPT

## 2022-06-02 PROCEDURE — 71045 X-RAY EXAM CHEST 1 VIEW: CPT

## 2022-06-02 PROCEDURE — 71275 CT ANGIOGRAPHY CHEST: CPT | Mod: MA

## 2022-06-02 PROCEDURE — 87899 AGENT NOS ASSAY W/OPTIC: CPT

## 2022-06-02 PROCEDURE — 96374 THER/PROPH/DIAG INJ IV PUSH: CPT

## 2022-06-02 PROCEDURE — 85025 COMPLETE CBC W/AUTO DIFF WBC: CPT

## 2022-06-02 PROCEDURE — 82803 BLOOD GASES ANY COMBINATION: CPT

## 2022-06-02 PROCEDURE — 80053 COMPREHEN METABOLIC PANEL: CPT

## 2022-06-02 PROCEDURE — 83880 ASSAY OF NATRIURETIC PEPTIDE: CPT

## 2022-06-02 PROCEDURE — 84484 ASSAY OF TROPONIN QUANT: CPT

## 2022-06-02 PROCEDURE — 94760 N-INVAS EAR/PLS OXIMETRY 1: CPT

## 2022-06-02 PROCEDURE — 83605 ASSAY OF LACTIC ACID: CPT

## 2022-06-02 PROCEDURE — 83036 HEMOGLOBIN GLYCOSYLATED A1C: CPT

## 2022-06-02 PROCEDURE — 86900 BLOOD TYPING SEROLOGIC ABO: CPT

## 2022-06-02 PROCEDURE — 80048 BASIC METABOLIC PNL TOTAL CA: CPT

## 2022-06-02 PROCEDURE — 81003 URINALYSIS AUTO W/O SCOPE: CPT

## 2022-06-02 PROCEDURE — 87070 CULTURE OTHR SPECIMN AEROBIC: CPT

## 2022-06-02 PROCEDURE — 82962 GLUCOSE BLOOD TEST: CPT

## 2022-06-02 PROCEDURE — 93005 ELECTROCARDIOGRAM TRACING: CPT

## 2022-06-02 PROCEDURE — 99292 CRITICAL CARE ADDL 30 MIN: CPT | Mod: 25

## 2022-06-02 PROCEDURE — 93306 TTE W/DOPPLER COMPLETE: CPT

## 2022-06-02 PROCEDURE — 94660 CPAP INITIATION&MGMT: CPT

## 2022-06-02 PROCEDURE — 85027 COMPLETE CBC AUTOMATED: CPT

## 2022-06-02 PROCEDURE — 85730 THROMBOPLASTIN TIME PARTIAL: CPT

## 2022-06-02 PROCEDURE — 87040 BLOOD CULTURE FOR BACTERIA: CPT

## 2022-06-02 PROCEDURE — 36415 COLL VENOUS BLD VENIPUNCTURE: CPT

## 2022-06-02 PROCEDURE — 85379 FIBRIN DEGRADATION QUANT: CPT

## 2022-06-02 PROCEDURE — 87449 NOS EACH ORGANISM AG IA: CPT

## 2022-06-02 PROCEDURE — 83690 ASSAY OF LIPASE: CPT

## 2022-06-02 PROCEDURE — 84100 ASSAY OF PHOSPHORUS: CPT

## 2022-06-02 PROCEDURE — 0225U NFCT DS DNA&RNA 21 SARSCOV2: CPT

## 2022-06-02 PROCEDURE — 86703 HIV-1/HIV-2 1 RESULT ANTBDY: CPT

## 2022-06-02 PROCEDURE — 86850 RBC ANTIBODY SCREEN: CPT

## 2022-06-02 PROCEDURE — 85610 PROTHROMBIN TIME: CPT

## 2022-06-02 PROCEDURE — 80307 DRUG TEST PRSMV CHEM ANLYZR: CPT

## 2022-06-02 PROCEDURE — 83735 ASSAY OF MAGNESIUM: CPT

## 2022-06-02 RX ORDER — CEFUROXIME AXETIL 250 MG
1 TABLET ORAL
Qty: 4 | Refills: 0
Start: 2022-06-02 | End: 2022-06-03

## 2022-06-02 RX ORDER — AZITHROMYCIN 500 MG/1
1 TABLET, FILM COATED ORAL
Qty: 1 | Refills: 0
Start: 2022-06-02 | End: 2022-06-02

## 2022-06-02 RX ADMIN — CEFTRIAXONE 100 MILLIGRAM(S): 500 INJECTION, POWDER, FOR SOLUTION INTRAMUSCULAR; INTRAVENOUS at 05:30

## 2022-06-02 RX ADMIN — CHLORHEXIDINE GLUCONATE 1 APPLICATION(S): 213 SOLUTION TOPICAL at 05:31

## 2022-06-02 NOTE — DISCHARGE NOTE NURSING/CASE MANAGEMENT/SOCIAL WORK - PATIENT PORTAL LINK FT
You can access the FollowMyHealth Patient Portal offered by [facility] by registering at the following website: [email or web address]. By joining [email or web address] FollowMyHealth portal, you will also be able to view your health information using other applications (apps) compatible with our system.

## 2022-06-02 NOTE — DISCHARGE NOTE NURSING/CASE MANAGEMENT/SOCIAL WORK - NSDCPEFALRISK_GEN_ALL_CORE
For information on Fall & Injury Prevention, visit: [email or web address] OR  [email or web address] OR  https://www.cdc.gov/steadi/patient.html

## 2022-06-02 NOTE — DIETITIAN INITIAL EVALUATION ADULT - PERTINENT MEDS FT
MEDICATIONS  (STANDING):  azithromycin  IVPB 500 milliGRAM(s) IV Intermittent every 24 hours  cefTRIAXone   IVPB 1000 milliGRAM(s) IV Intermittent every 24 hours  chlorhexidine 2% Cloths 1 Application(s) Topical <User Schedule>  influenza   Vaccine 0.5 milliLiter(s) IntraMuscular once    MEDICATIONS  (PRN):

## 2022-06-02 NOTE — PROGRESS NOTE ADULT - SUBJECTIVE AND OBJECTIVE BOX
PGY-1 Progress Note discussed with attending    PAGER #: [phone]] TILL 5:00 PM  PLEASE CONTACT ON CALL TEAM:  - On Call Team (Please refer to Tonya) FROM 5:00 PM - 8:30PM  - Nightfloat Team FROM 8:30 -7:30 AM    OVERNIGHT EVENTS:   - No acute events. Patient tolerating diet    REVIEW OF SYSTEMS:  CONSTITUTIONAL: No fever, weight loss, or fatigue  RESPIRATORY: No cough, wheezing, chills or hemoptysis; +substernal chest pain on deep inspiration  CARDIOVASCULAR: No chest pain, palpitations, dizziness, or leg swelling  GASTROINTESTINAL: No abdominal pain. No nausea, vomiting, or hematemesis; No diarrhea or constipation. No melena or hematochezia.  GENITOURINARY: No dysuria or hematuria, urinary frequency  NEUROLOGICAL: No headaches, memory loss, loss of strength, numbness, or tremors  SKIN: No itching, burning, rashes, or lesions     MEDICATIONS  (STANDING):  azithromycin  IVPB 500 milliGRAM(s) IV Intermittent every 24 hours  cefTRIAXone   IVPB 1000 milliGRAM(s) IV Intermittent every 24 hours  chlorhexidine 2% Cloths 1 Application(s) Topical <User Schedule>  influenza   Vaccine 0.5 milliLiter(s) IntraMuscular once    MEDICATIONS  (PRN):      Vital Signs Last 24 Hrs  T(C): 36.7 (01 Jun 2022 08:00), Max: 36.9 (31 May 2022 23:15)  T(F): 98 (01 Jun 2022 08:00), Max: 98.5 (31 May 2022 23:15)  HR: 74 (01 Jun 2022 11:00) (57 - 96)  BP: 119/60 (01 Jun 2022 11:00) (92/64 - 171/78)  BP(mean): 77 (01 Jun 2022 11:00) (67 - 92)  RR: 20 (01 Jun 2022 11:00) (9 - 34)  SpO2: 95% (01 Jun 2022 11:00) (89% - 100%)    PHYSICAL EXAMINATION:  GENERAL: NAD, AAOx3,  HEAD: AT/NC  EYES: conjunctiva and sclera clear  NECK: supple, No JVD noted,   CHEST/LUNG: No tenderness to palpation, CTABL;  HEART: regular rate and rhythm; no murmurs, rubs, or gallops  ABDOMEN: soft, nontender, nondistended; Bowel sounds present  EXTREMITIES:  2+ Peripheral Pulses, No clubbing, cyanosis, or edema  SKIN: warm dry                          13.4   17.99 )-----------( 195      ( 01 Jun 2022 04:14 )             41.4     06-01    139  |  104  |  10  ----------------------------<  104<H>  4.2   |  33<H>  |  1.10    Ca    8.9      01 Jun 2022 04:14  Phos  3.5     06-01  Mg     2.5     06-01    TPro  6.8  /  Alb  3.3<L>  /  TBili  0.3  /  DBili  x   /  AST  23  /  ALT  26  /  AlkPhos  97  05-31    LIVER FUNCTIONS - ( 31 May 2022 13:36 )  Alb: 3.3 g/dL / Pro: 6.8 g/dL / ALK PHOS: 97 U/L / ALT: 26 U/L DA / AST: 23 U/L / GGT: x               PT/INR - ( 31 May 2022 13:36 )   PT: 11.2 sec;   INR: 0.94 ratio         PTT - ( 31 May 2022 13:36 )  PTT:28.1 sec  SARS-CoV-2: NotDetec (31 May 2022 13:36)      CAPILLARY BLOOD GLUCOSE      POCT Blood Glucose.: 235 mg/dL (31 May 2022 12:38)      RADIOLOGY & ADDITIONAL TESTS:    < from: CT Angio Chest PE Protocol w/ IV Cont (05.31.22 @ 17:50) >    IMPRESSION:  No pulmonary embolism.    Extensive bilateral lung opacity.    Emphysema.    --- End of Report ---    < end of copied text >                    
INTERVAL HPI/OVERNIGHT EVENTS: No acute events. Patient complains of mild pleuritic chest pain    PRESSORS: [ ] YES [x ] NO    Antimicrobial:  azithromycin  IVPB 500 milliGRAM(s) IV Intermittent every 24 hours  cefTRIAXone   IVPB 1000 milliGRAM(s) IV Intermittent every 24 hours    Cardiovascular:    Pulmonary:    Hematalogic:    Other:  chlorhexidine 2% Cloths 1 Application(s) Topical <User Schedule>  influenza   Vaccine 0.5 milliLiter(s) IntraMuscular once    azithromycin  IVPB 500 milliGRAM(s) IV Intermittent every 24 hours  cefTRIAXone   IVPB 1000 milliGRAM(s) IV Intermittent every 24 hours  chlorhexidine 2% Cloths 1 Application(s) Topical <User Schedule>  influenza   Vaccine 0.5 milliLiter(s) IntraMuscular once    Drug Dosing Weight  Height (cm): 172.7 (31 May 2022 12:29)  Weight (kg): 70.3 (31 May 2022 12:29)  BMI (kg/m2): 23.6 (31 May 2022 12:29)  BSA (m2): 1.83 (31 May 2022 12:29)    CENTRAL LINE: [ ] YES [ x] NO  LOCATION:   DATE INSERTED:  REMOVE: [ ] YES [ ] NO  EXPLAIN:    RAMIREZ: [ ] YES [x ] NO    DATE INSERTED:  REMOVE:  [ ] YES [ ] NO  EXPLAIN:    A-LINE:  [ ] YES [x ] NO  LOCATION:   DATE INSERTED:  REMOVE:  [ ] YES [ ] NO  EXPLAIN:    PMH -reviewed admission note, no change since admission      ABG - ( 31 May 2022 13:16 )  pH, Arterial: 7.40  pH, Blood: x     /  pCO2: 38    /  pO2: 189   / HCO3: 24    / Base Excess: -1.1  /  SaO2: 99                    06-01 @ 07:01  -  06-02 @ 07:00  --------------------------------------------------------  IN: 1380 mL / OUT: 2050 mL / NET: -670 mL            PHYSICAL EXAM:    GENERAL: NAD  HEAD:  Atraumatic, Normocephalic  EYES: conjunctiva and sclera clear  ENMT: No tonsillar erythema, exudates, or enlargement; Moist mucous membranes, Good dentition,  NECK: Supple, normal appearance, No JVD; Normal thyroid; Trachea midline  NERVOUS SYSTEM:  Alert & Oriented X3, Good concentration; Motor Strength 5/5 B/L upper and lower extremities; DTRs 2+ intact and symmetric  CHEST/LUNG: No chest deformity; Normal percussion bilaterally; No rales, rhonchi, wheezing   HEART: Regular rate and rhythm; No murmurs, rubs, or gallops  ABDOMEN: Soft, Nontender, Nondistended;Bowel sounds present  EXTREMITIES:  2+ Peripheral Pulses, No clubbing, cyanosis, or edema  LYMPH: No lymphadenopathy noted  SKIN: No rashes or lesions; Good capillary refill      LABS:  CBC Full  -  ( 02 Jun 2022 03:40 )  WBC Count : 10.82 K/uL  RBC Count : 4.26 M/uL  Hemoglobin : 12.4 g/dL  Hematocrit : 38.0 %  Platelet Count - Automated : 168 K/uL  Mean Cell Volume : 89.2 fl  Mean Cell Hemoglobin : 29.1 pg  Mean Cell Hemoglobin Concentration : 32.6 gm/dL  Auto Neutrophil # : x  Auto Lymphocyte # : x  Auto Monocyte # : x  Auto Eosinophil # : x  Auto Basophil # : x  Auto Neutrophil % : x  Auto Lymphocyte % : x  Auto Monocyte % : x  Auto Eosinophil % : x  Auto Basophil % : x    06-02    139  |  108  |  13  ----------------------------<  97  4.8   |  30  |  1.07    Ca    9.1      02 Jun 2022 03:40  Phos  2.8     06-02  Mg     2.3     06-02    TPro  6.1  /  Alb  2.9<L>  /  TBili  0.6  /  DBili  x   /  AST  19  /  ALT  20  /  AlkPhos  65  06-02    PT/INR - ( 31 May 2022 13:36 )   PT: 11.2 sec;   INR: 0.94 ratio         PTT - ( 31 May 2022 13:36 )  PTT:28.1 sec    Culture Results:   No growth to date. (06-01 @ 06:21)  Culture Results:   No growth to date. (06-01 @ 06:21)      RADIOLOGY & ADDITIONAL STUDIES REVIEWED:  ***    [ ]GOALS OF CARE DISCUSSION WITH PATIENT/FAMILY/PROXY:    CRITICAL CARE TIME SPENT: 35 minutes

## 2022-06-02 NOTE — DISCHARGE NOTE PROVIDER - NSDCMRMEDTOKEN_GEN_ALL_CORE_FT
azithromycin 500 mg oral tablet: 1 tab(s) orally once a day   cefuroxime 500 mg oral tablet: 1 tab(s) orally 2 times a day

## 2022-06-02 NOTE — DIETITIAN INITIAL EVALUATION ADULT - PERTINENT LABORATORY DATA
06-02    139  |  108  |  13  ----------------------------<  97  4.8   |  30  |  1.07    Ca    9.1      02 Jun 2022 03:40  Phos  2.8     06-02  Mg     2.3     06-02    TPro  6.1  /  Alb  2.9<L>  /  TBili  0.6  /  DBili  x   /  AST  19  /  ALT  20  /  AlkPhos  65  06-02  A1C with Estimated Average Glucose Result: 5.4 % (05-31-22 @ 23:14)

## 2022-06-02 NOTE — DISCHARGE NOTE PROVIDER - HOSPITAL COURSE
Patient is a 32 year old with no reported PMHx who presented with chief complaint of overdose. Patient smoked a tiny amount of fentanyl through a crack pipe in which he lost consciousness and then he was given 2 doses of intranasal narcan and presented to hospital. Patient also reported cough, sputum production, and shortness of breath for the past couple of days. Started having hemoptysis today. Otherwise no chest pain, abdominal pain, fever, chills, diarrhea.    In the ED, patient was noted hypoxic, started on BiPAP. Xray revealed multifocal pneumonia vs possible fluid overload. Admitted to ICU for noncardiogenic pulmonary edema requiring BIPAP. Edema likely in setting of fentanyl vs narcan use. Patient was weened off of BIPAP in the ICU, now saturating well on RA. Was started on course of rocephin and azithro for possible multifocal pneumonia. CTA was performed which was negative for PE. Discharged on oral azithromycin and ceftin.    Given patient's improved clinical status and current hemodynamic stability, decision was made to discharge. Please refer to patient's complete medical chart with documents for a full hospital course, for this is only a brief summary.

## 2022-06-02 NOTE — PROGRESS NOTE ADULT - ASSESSMENT
Patient is a 32 year old male with PMHx of polysubstance abuse who presented s/p overdose, now found to be hypoxic requiring BiPAP    Assessment  - acute hypoxic respiratory failure  - Opioid overdose  - pulmonary edema  - pneumonia  - Poly substance abuse  - YAMINI    Plan:  Neuro:  # Polysubstance abuse  - Utox positive for THC, Cocaine  - SW consult  - s/p narcan for opioid overdose    CV:  # No acute issues  - noncardiogenic pulmonary edema  - Echo wnl    Pulm:  # Acute hypoxic respiratory failure 2/2 Noncardiogenic pulmonary edema  - Bedside echo with confluent B-lines  - Suspect noncardiogenic secondary to opioid use vs Narcan  - Continue BiPAP  - Given 40mg Lasix in ED  - hold further diuresis, gentle hydration  - CTA negative for PE  - hemoptysis likely in the setting of acute pulmonary edema  - Echo wnl  - Off BIPAP    ID:  # Community Acquired vs aspiration PNA  - HPI with reports of sputum production, cough, SOB  - CTA negative for PE, shows b/l infiltrates  - Procal elevated  - Will continue to cover for PNA with rocephin and azithromycin    Nephro:  # YAMINI  - Likely pre-renal  - s/p lasix in ED, hold further diuresis and proceed with gentle IVF hydration  - Resolved    GI:  - npo until off bipap    Endo:  # Elevated blood glucose  - A1c 5.4    FEN:  - Judicious use of IVf    Prophy:  no chemoprophylaxis indicated    Dispo:  - Downgrade to medicine

## 2022-06-02 NOTE — PROGRESS NOTE ADULT - ATTENDING COMMENTS
IMP: This is a  32 year old man with  polysubstance abuse who presented s/p overdose, now found to be hypoxic requiring BiPAP    Assessment  - acute hypoxic respiratory failure  - Opioid overdose  - pulmonary edema  - pneumonia  - Poly substance abuse  - YAMINI    Plan:  Neuro:  # Polysubstance abuse  - Utox positive for THC, Cocaine  - SW consult  - s/p narcan for opioid overdose    CV:  # No acute issues  - noncardiogenic pulmonary edema  - Echo wnl    Pulm:  # Acute hypoxic respiratory failure 2/2 Noncardiogenic pulmonary edema  - Bedside echo with confluent B-lines  - Suspect noncardiogenic secondary to opioid use vs Narcan  - Continue BiPAP  - Given 40mg Lasix in ED  - hold further diuresis, gentle hydration  - CTA negative for PE  - hemoptysis likely in the setting of acute pulmonary edema  - Echo wnl  - Off BIPAP    ID:  # Community Acquired vs aspiration PNA  - HPI with reports of sputum production, cough, SOB  - CTA negative for PE, shows b/l infiltrates  - Procal elevated  - Will continue to cover for PNA with rocephin and azithromycin    Nephro:  # YAMINI  - Likely pre-renal  - s/p lasix in ED, hold further diuresis and proceed with gentle IVF hydration  - Resolved    GI:  - npo until off bipap    Endo:  # Elevated blood glucose  - A1c 5.4    FEN:  - Judicious use of IVf .
IMP: This is a  32 year old man with  polysubstance abuse who presented s/p overdose, now found to be hypoxic requiring BiPAP    Assessment  - acute hypoxic respiratory failure  - Opioid overdose  - pulmonary edema  - pneumonia  - Poly substance abuse  - YAMINI    Plan:  Neuro:  # Polysubstance abuse  - Utox positive for THC, Cocaine  - SW consult  - s/p narcan for opioid overdose    CV:  # No acute issues  - noncardiogenic pulmonary edema  - Echo wnl    Pulm:  # Acute hypoxic respiratory failure 2/2 Noncardiogenic pulmonary edema  - Bedside echo with confluent B-lines  - Suspect noncardiogenic secondary to opioid use vs Narcan  - Continue BiPAP  - Given 40mg Lasix in ED  - hold further diuresis, gentle hydration  - CTA negative for PE  - hemoptysis likely in the setting of acute pulmonary edema  - Echo wnl  - Off BIPAP    ID:  # Community Acquired vs aspiration PNA  - HPI with reports of sputum production, cough, SOB  - CTA negative for PE, shows b/l infiltrates  - Procal elevated  - Will continue to cover for PNA with rocephin and azithromycin    Nephro:  # YAMINI  - Likely pre-renal  - s/p lasix in ED, hold further diuresis and proceed with gentle IVF hydration  - Resolved    GI:  - npo until off bipap    Endo:  # Elevated blood glucose  - A1c 5.4    FEN:  - Judicious use of IVf

## 2022-06-02 NOTE — DIETITIAN INITIAL EVALUATION ADULT - OTHER INFO
Pt lives home PTA, alert, oriented, visited in ICU, whole body covered in sheet, awake per RN, but pt declining RD interview at present, despite of encouragement by RN at bedside for communication, but pt refused; appetite good, tolerating 100% breakfast per RN; Limited intake/wt change history data available at present;  on the case

## 2022-06-03 LAB
LEGIONELLA AG UR QL: NEGATIVE — SIGNIFICANT CHANGE UP
S PNEUM AG UR QL: NEGATIVE — SIGNIFICANT CHANGE UP

## 2022-06-04 LAB
CULTURE RESULTS: SIGNIFICANT CHANGE UP
SPECIMEN SOURCE: SIGNIFICANT CHANGE UP

## 2022-06-06 LAB
CULTURE RESULTS: SIGNIFICANT CHANGE UP
CULTURE RESULTS: SIGNIFICANT CHANGE UP
SPECIMEN SOURCE: SIGNIFICANT CHANGE UP
SPECIMEN SOURCE: SIGNIFICANT CHANGE UP

## 2022-07-10 NOTE — PATIENT PROFILE ADULT - FALL HARM RISK - FALL HARM RISK
Relevant Problems   No relevant active problems       Anesthetic History   No history of anesthetic complications            Review of Systems / Medical History  Patient summary reviewed, nursing notes reviewed and pertinent labs reviewed    Pulmonary          Undiagnosed apnea         Neuro/Psych   Within defined limits           Cardiovascular  Within defined limits                Exercise tolerance: >4 METS     GI/Hepatic/Renal     GERD           Endo/Other  Within defined limits           Other Findings              Physical Exam    Airway  Mallampati: III  TM Distance: 4 - 6 cm  Neck ROM: normal range of motion   Mouth opening: Normal     Cardiovascular  Regular rate and rhythm,  S1 and S2 normal,  no murmur, click, rub, or gallop  Rhythm: regular  Rate: normal         Dental    Dentition: Caps/crowns     Pulmonary  Breath sounds clear to auscultation               Abdominal  GI exam deferred       Other Findings            Anesthetic Plan    ASA: 2  Anesthesia type: general    Monitoring Plan: BIS      Induction: Intravenous and RSI  Anesthetic plan and risks discussed with: Patient
Other

## 2024-07-02 ENCOUNTER — EMERGENCY (EMERGENCY)
Facility: HOSPITAL | Age: 34
LOS: 1 days | Discharge: LEFT BEFORE DISCHARGE | End: 2024-07-02
Payer: SELF-PAY

## 2024-07-02 VITALS
DIASTOLIC BLOOD PRESSURE: 71 MMHG | RESPIRATION RATE: 16 BRPM | OXYGEN SATURATION: 97 % | HEART RATE: 85 BPM | SYSTOLIC BLOOD PRESSURE: 114 MMHG

## 2024-07-02 PROCEDURE — L9991: CPT
